# Patient Record
Sex: FEMALE | Race: WHITE | Employment: OTHER | URBAN - METROPOLITAN AREA
[De-identification: names, ages, dates, MRNs, and addresses within clinical notes are randomized per-mention and may not be internally consistent; named-entity substitution may affect disease eponyms.]

---

## 2017-01-23 PROBLEM — E66.01 MORBID OBESITY WITH BMI OF 40.0-44.9, ADULT (HCC): Status: ACTIVE | Noted: 2017-01-23

## 2017-03-28 PROCEDURE — 88305 TISSUE EXAM BY PATHOLOGIST: CPT | Performed by: INTERNAL MEDICINE

## 2017-04-26 PROBLEM — M47.816 LUMBAR ARTHROPATHY: Status: ACTIVE | Noted: 2017-04-26

## 2017-11-03 PROBLEM — Z80.8 FAMILY HISTORY OF MELANOMA: Status: ACTIVE | Noted: 2017-11-03

## 2018-01-09 PROBLEM — R73.03 PREDIABETES: Status: ACTIVE | Noted: 2018-01-09

## 2018-01-09 PROBLEM — R94.31 ABNORMAL EKG: Status: ACTIVE | Noted: 2018-01-09

## 2018-01-09 PROBLEM — Z51.81 ENCOUNTER FOR THERAPEUTIC DRUG MONITORING: Status: ACTIVE | Noted: 2018-01-09

## 2018-01-11 PROBLEM — M47.816 FACET ARTHROPATHY, LUMBAR: Status: ACTIVE | Noted: 2018-01-11

## 2018-01-11 PROBLEM — M54.16 LUMBAR RADICULITIS: Status: ACTIVE | Noted: 2018-01-11

## 2018-01-11 PROBLEM — M51.36 DDD (DEGENERATIVE DISC DISEASE), LUMBAR: Status: ACTIVE | Noted: 2018-01-11

## 2018-01-11 PROBLEM — M47.816 LUMBAR SPONDYLOSIS: Status: ACTIVE | Noted: 2018-01-11

## 2018-01-19 PROCEDURE — 82397 CHEMILUMINESCENT ASSAY: CPT | Performed by: INTERNAL MEDICINE

## 2018-01-19 PROCEDURE — 82607 VITAMIN B-12: CPT | Performed by: INTERNAL MEDICINE

## 2018-01-31 PROBLEM — I70.8 AORTO-ILIAC ATHEROSCLEROSIS (HCC): Status: ACTIVE | Noted: 2018-01-31

## 2018-01-31 PROBLEM — I70.0 AORTO-ILIAC ATHEROSCLEROSIS (HCC): Status: ACTIVE | Noted: 2018-01-31

## 2018-01-31 PROBLEM — I70.8 AORTO-ILIAC ATHEROSCLEROSIS: Status: ACTIVE | Noted: 2018-01-31

## 2018-01-31 PROBLEM — I70.0 AORTO-ILIAC ATHEROSCLEROSIS: Status: ACTIVE | Noted: 2018-01-31

## 2018-02-13 PROCEDURE — 81003 URINALYSIS AUTO W/O SCOPE: CPT | Performed by: FAMILY MEDICINE

## 2018-02-21 PROBLEM — E78.5 HYPERLIPIDEMIA: Status: ACTIVE | Noted: 2018-02-21

## 2018-03-14 PROBLEM — M71.38 SYNOVIAL CYST OF LUMBAR FACET JOINT: Status: ACTIVE | Noted: 2018-03-14

## 2018-03-14 PROBLEM — M43.16 SPONDYLOLISTHESIS OF LUMBAR REGION: Status: ACTIVE | Noted: 2017-04-26

## 2018-06-12 PROBLEM — E66.01 MORBID OBESITY WITH BMI OF 40.0-44.9, ADULT (HCC): Status: RESOLVED | Noted: 2017-01-23 | Resolved: 2018-06-12

## 2018-06-12 PROBLEM — E66.09 CLASS 2 OBESITY DUE TO EXCESS CALORIES WITHOUT SERIOUS COMORBIDITY WITH BODY MASS INDEX (BMI) OF 36.0 TO 36.9 IN ADULT: Status: ACTIVE | Noted: 2018-06-12

## 2018-06-12 PROCEDURE — 87081 CULTURE SCREEN ONLY: CPT | Performed by: FAMILY MEDICINE

## 2018-06-21 ENCOUNTER — LAB ENCOUNTER (OUTPATIENT)
Dept: LAB | Facility: HOSPITAL | Age: 63
End: 2018-06-21
Attending: ORTHOPAEDIC SURGERY
Payer: MEDICARE

## 2018-06-21 DIAGNOSIS — Z01.818 PRE-OP TESTING: ICD-10-CM

## 2018-06-21 PROCEDURE — 86901 BLOOD TYPING SEROLOGIC RH(D): CPT

## 2018-06-21 PROCEDURE — 86850 RBC ANTIBODY SCREEN: CPT

## 2018-06-21 PROCEDURE — 86900 BLOOD TYPING SEROLOGIC ABO: CPT

## 2018-06-21 PROCEDURE — 81003 URINALYSIS AUTO W/O SCOPE: CPT

## 2018-06-21 RX ORDER — NAPROXEN SODIUM 220 MG
TABLET ORAL
Status: ON HOLD | COMMUNITY
End: 2018-07-10

## 2018-07-09 ENCOUNTER — HOSPITAL ENCOUNTER (INPATIENT)
Facility: HOSPITAL | Age: 63
LOS: 1 days | Discharge: HOME | DRG: 455 | End: 2018-07-10
Attending: ORTHOPAEDIC SURGERY | Admitting: ORTHOPAEDIC SURGERY
Payer: MEDICARE

## 2018-07-09 ENCOUNTER — ANESTHESIA EVENT (OUTPATIENT)
Dept: SURGERY | Facility: HOSPITAL | Age: 63
DRG: 455 | End: 2018-07-09
Payer: MEDICARE

## 2018-07-09 ENCOUNTER — APPOINTMENT (OUTPATIENT)
Dept: GENERAL RADIOLOGY | Facility: HOSPITAL | Age: 63
DRG: 455 | End: 2018-07-09
Attending: ORTHOPAEDIC SURGERY
Payer: MEDICARE

## 2018-07-09 ENCOUNTER — ANESTHESIA (OUTPATIENT)
Dept: SURGERY | Facility: HOSPITAL | Age: 63
DRG: 455 | End: 2018-07-09
Payer: MEDICARE

## 2018-07-09 DIAGNOSIS — Z01.818 PRE-OP TESTING: Primary | ICD-10-CM

## 2018-07-09 DIAGNOSIS — M43.16 SPONDYLOLISTHESIS OF LUMBAR REGION: ICD-10-CM

## 2018-07-09 DIAGNOSIS — M54.16 LUMBAR RADICULOPATHY: ICD-10-CM

## 2018-07-09 DIAGNOSIS — M48.061 SPINAL STENOSIS OF LUMBAR REGION, UNSPECIFIED WHETHER NEUROGENIC CLAUDICATION PRESENT: ICD-10-CM

## 2018-07-09 LAB
ERYTHROCYTE [DISTWIDTH] IN BLOOD BY AUTOMATED COUNT: 14 % (ref 11–15)
GLUCOSE BLDC GLUCOMTR-MCNC: 130 MG/DL (ref 70–99)
HCT VFR BLD AUTO: 34.3 % (ref 35–48)
HGB BLD-MCNC: 11.3 G/DL (ref 12–16)
MCH RBC QN AUTO: 28.9 PG (ref 27–32)
MCHC RBC AUTO-ENTMCNC: 33 G/DL (ref 32–37)
MCV RBC AUTO: 87.3 FL (ref 80–100)
PLATELET # BLD AUTO: 160 K/UL (ref 140–400)
PMV BLD AUTO: 9.1 FL (ref 7.4–10.3)
RBC # BLD AUTO: 3.92 M/UL (ref 3.7–5.4)
WBC # BLD AUTO: 9.6 K/UL (ref 4–11)

## 2018-07-09 PROCEDURE — 36415 COLL VENOUS BLD VENIPUNCTURE: CPT | Performed by: ORTHOPAEDIC SURGERY

## 2018-07-09 PROCEDURE — 72100 X-RAY EXAM L-S SPINE 2/3 VWS: CPT | Performed by: ORTHOPAEDIC SURGERY

## 2018-07-09 PROCEDURE — 94660 CPAP INITIATION&MGMT: CPT

## 2018-07-09 PROCEDURE — 0SB40ZZ EXCISION OF LUMBOSACRAL DISC, OPEN APPROACH: ICD-10-PCS | Performed by: ORTHOPAEDIC SURGERY

## 2018-07-09 PROCEDURE — 95860 NEEDLE EMG 1 EXTREMITY: CPT | Performed by: ORTHOPAEDIC SURGERY

## 2018-07-09 PROCEDURE — 01NB0ZZ RELEASE LUMBAR NERVE, OPEN APPROACH: ICD-10-PCS | Performed by: ORTHOPAEDIC SURGERY

## 2018-07-09 PROCEDURE — 76001 XR C-ARM FLUORO >1 HOUR  (CPT=76001): CPT | Performed by: ORTHOPAEDIC SURGERY

## 2018-07-09 PROCEDURE — 0SG3071 FUSION OF LUMBOSACRAL JOINT WITH AUTOLOGOUS TISSUE SUBSTITUTE, POSTERIOR APPROACH, POSTERIOR COLUMN, OPEN APPROACH: ICD-10-PCS | Performed by: ORTHOPAEDIC SURGERY

## 2018-07-09 PROCEDURE — 85027 COMPLETE CBC AUTOMATED: CPT | Performed by: PHYSICIAN ASSISTANT

## 2018-07-09 PROCEDURE — 0SG30AJ FUSION OF LUMBOSACRAL JOINT WITH INTERBODY FUSION DEVICE, POSTERIOR APPROACH, ANTERIOR COLUMN, OPEN APPROACH: ICD-10-PCS | Performed by: ORTHOPAEDIC SURGERY

## 2018-07-09 PROCEDURE — 4A11X4G MONITORING OF PERIPHERAL NERVOUS ELECTRICAL ACTIVITY, INTRAOPERATIVE, EXTERNAL APPROACH: ICD-10-PCS | Performed by: ORTHOPAEDIC SURGERY

## 2018-07-09 PROCEDURE — 95938 SOMATOSENSORY TESTING: CPT | Performed by: ORTHOPAEDIC SURGERY

## 2018-07-09 PROCEDURE — 88304 TISSUE EXAM BY PATHOLOGIST: CPT | Performed by: ORTHOPAEDIC SURGERY

## 2018-07-09 PROCEDURE — 82962 GLUCOSE BLOOD TEST: CPT

## 2018-07-09 DEVICE — 5CC BONE MATRIX-SPINE: Type: IMPLANTABLE DEVICE | Status: FUNCTIONAL

## 2018-07-09 DEVICE — GRAFT BN LRG ALLO FRZN VIABLE: Type: IMPLANTABLE DEVICE | Status: FUNCTIONAL

## 2018-07-09 RX ORDER — LIDOCAINE HYDROCHLORIDE 10 MG/ML
INJECTION, SOLUTION EPIDURAL; INFILTRATION; INTRACAUDAL; PERINEURAL AS NEEDED
Status: DISCONTINUED | OUTPATIENT
Start: 2018-07-09 | End: 2018-07-09 | Stop reason: SURG

## 2018-07-09 RX ORDER — POLYETHYLENE GLYCOL 3350 17 G/17G
17 POWDER, FOR SOLUTION ORAL DAILY PRN
Status: DISCONTINUED | OUTPATIENT
Start: 2018-07-09 | End: 2018-07-10

## 2018-07-09 RX ORDER — OXYCODONE HYDROCHLORIDE AND ACETAMINOPHEN 5; 325 MG/1; MG/1
1 TABLET ORAL EVERY 4 HOURS PRN
Status: DISCONTINUED | OUTPATIENT
Start: 2018-07-09 | End: 2018-07-10

## 2018-07-09 RX ORDER — MONTELUKAST SODIUM 10 MG/1
10 TABLET ORAL NIGHTLY
Status: DISCONTINUED | OUTPATIENT
Start: 2018-07-09 | End: 2018-07-10

## 2018-07-09 RX ORDER — DEXAMETHASONE SODIUM PHOSPHATE 10 MG/ML
8 INJECTION, SOLUTION INTRAMUSCULAR; INTRAVENOUS EVERY 8 HOURS
Status: COMPLETED | OUTPATIENT
Start: 2018-07-09 | End: 2018-07-10

## 2018-07-09 RX ORDER — DEXAMETHASONE SODIUM PHOSPHATE 4 MG/ML
VIAL (ML) INJECTION AS NEEDED
Status: DISCONTINUED | OUTPATIENT
Start: 2018-07-09 | End: 2018-07-09 | Stop reason: SURG

## 2018-07-09 RX ORDER — NEOSTIGMINE METHYLSULFATE 0.5 MG/ML
INJECTION INTRAVENOUS AS NEEDED
Status: DISCONTINUED | OUTPATIENT
Start: 2018-07-09 | End: 2018-07-09 | Stop reason: SURG

## 2018-07-09 RX ORDER — CELECOXIB 100 MG/1
100 CAPSULE ORAL ONCE
Status: COMPLETED | OUTPATIENT
Start: 2018-07-09 | End: 2018-07-09

## 2018-07-09 RX ORDER — METOCLOPRAMIDE HYDROCHLORIDE 5 MG/ML
10 INJECTION INTRAMUSCULAR; INTRAVENOUS EVERY 6 HOURS PRN
Status: DISCONTINUED | OUTPATIENT
Start: 2018-07-09 | End: 2018-07-10

## 2018-07-09 RX ORDER — MAGNESIUM OXIDE 400 MG (241.3 MG MAGNESIUM) TABLET
400 TABLET DAILY
Status: DISCONTINUED | OUTPATIENT
Start: 2018-07-10 | End: 2018-07-10

## 2018-07-09 RX ORDER — MAGNESIUM HYDROXIDE 1200 MG/15ML
LIQUID ORAL CONTINUOUS PRN
Status: DISCONTINUED | OUTPATIENT
Start: 2018-07-09 | End: 2018-07-09

## 2018-07-09 RX ORDER — SODIUM CHLORIDE, SODIUM LACTATE, POTASSIUM CHLORIDE, CALCIUM CHLORIDE 600; 310; 30; 20 MG/100ML; MG/100ML; MG/100ML; MG/100ML
INJECTION, SOLUTION INTRAVENOUS CONTINUOUS
Status: DISCONTINUED | OUTPATIENT
Start: 2018-07-09 | End: 2018-07-09 | Stop reason: HOSPADM

## 2018-07-09 RX ORDER — SODIUM CHLORIDE, SODIUM LACTATE, POTASSIUM CHLORIDE, CALCIUM CHLORIDE 600; 310; 30; 20 MG/100ML; MG/100ML; MG/100ML; MG/100ML
INJECTION, SOLUTION INTRAVENOUS CONTINUOUS
Status: DISCONTINUED | OUTPATIENT
Start: 2018-07-09 | End: 2018-07-10

## 2018-07-09 RX ORDER — DIPHENHYDRAMINE HCL 25 MG
25 CAPSULE ORAL EVERY 4 HOURS PRN
Status: DISCONTINUED | OUTPATIENT
Start: 2018-07-09 | End: 2018-07-10

## 2018-07-09 RX ORDER — CEFAZOLIN SODIUM/WATER 2 G/20 ML
2 SYRINGE (ML) INTRAVENOUS ONCE
Status: COMPLETED | OUTPATIENT
Start: 2018-07-09 | End: 2018-07-09

## 2018-07-09 RX ORDER — HYDROMORPHONE HYDROCHLORIDE 1 MG/ML
0.2 INJECTION, SOLUTION INTRAMUSCULAR; INTRAVENOUS; SUBCUTANEOUS EVERY 2 HOUR PRN
Status: DISCONTINUED | OUTPATIENT
Start: 2018-07-09 | End: 2018-07-10

## 2018-07-09 RX ORDER — HYDROMORPHONE HYDROCHLORIDE 1 MG/ML
0.5 INJECTION, SOLUTION INTRAMUSCULAR; INTRAVENOUS; SUBCUTANEOUS ONCE
Status: DISCONTINUED | OUTPATIENT
Start: 2018-07-09 | End: 2018-07-09 | Stop reason: HOSPADM

## 2018-07-09 RX ORDER — METOCLOPRAMIDE 10 MG/1
10 TABLET ORAL ONCE
Status: DISCONTINUED | OUTPATIENT
Start: 2018-07-09 | End: 2018-07-09 | Stop reason: HOSPADM

## 2018-07-09 RX ORDER — FAMOTIDINE 20 MG/1
20 TABLET ORAL ONCE
Status: COMPLETED | OUTPATIENT
Start: 2018-07-09 | End: 2018-07-09

## 2018-07-09 RX ORDER — TOPIRAMATE 25 MG/1
50 TABLET ORAL 2 TIMES DAILY
Status: DISCONTINUED | OUTPATIENT
Start: 2018-07-09 | End: 2018-07-10

## 2018-07-09 RX ORDER — ACETAMINOPHEN 500 MG
1000 TABLET ORAL ONCE
Status: COMPLETED | OUTPATIENT
Start: 2018-07-09 | End: 2018-07-09

## 2018-07-09 RX ORDER — HYDROMORPHONE HYDROCHLORIDE 1 MG/ML
0.8 INJECTION, SOLUTION INTRAMUSCULAR; INTRAVENOUS; SUBCUTANEOUS EVERY 2 HOUR PRN
Status: DISCONTINUED | OUTPATIENT
Start: 2018-07-09 | End: 2018-07-10

## 2018-07-09 RX ORDER — SCOLOPAMINE TRANSDERMAL SYSTEM 1 MG/1
1 PATCH, EXTENDED RELEASE TRANSDERMAL ONCE
Status: DISCONTINUED | OUTPATIENT
Start: 2018-07-09 | End: 2018-07-12

## 2018-07-09 RX ORDER — TIZANIDINE 4 MG/1
4 TABLET ORAL 3 TIMES DAILY PRN
Status: DISCONTINUED | OUTPATIENT
Start: 2018-07-09 | End: 2018-07-10

## 2018-07-09 RX ORDER — BUPIVACAINE HYDROCHLORIDE 2.5 MG/ML
INJECTION, SOLUTION EPIDURAL; INFILTRATION; INTRACAUDAL AS NEEDED
Status: DISCONTINUED | OUTPATIENT
Start: 2018-07-09 | End: 2018-07-09 | Stop reason: HOSPADM

## 2018-07-09 RX ORDER — GLYCOPYRROLATE 0.2 MG/ML
INJECTION INTRAMUSCULAR; INTRAVENOUS AS NEEDED
Status: DISCONTINUED | OUTPATIENT
Start: 2018-07-09 | End: 2018-07-09 | Stop reason: SURG

## 2018-07-09 RX ORDER — DOCUSATE SODIUM 100 MG/1
100 CAPSULE, LIQUID FILLED ORAL 2 TIMES DAILY
Status: DISCONTINUED | OUTPATIENT
Start: 2018-07-09 | End: 2018-07-10

## 2018-07-09 RX ORDER — ACETAMINOPHEN 10 MG/ML
1000 INJECTION, SOLUTION INTRAVENOUS ONCE
Status: DISCONTINUED | OUTPATIENT
Start: 2018-07-09 | End: 2018-07-09 | Stop reason: HOSPADM

## 2018-07-09 RX ORDER — ONDANSETRON 2 MG/ML
INJECTION INTRAMUSCULAR; INTRAVENOUS AS NEEDED
Status: DISCONTINUED | OUTPATIENT
Start: 2018-07-09 | End: 2018-07-09 | Stop reason: SURG

## 2018-07-09 RX ORDER — OXYCODONE HYDROCHLORIDE AND ACETAMINOPHEN 5; 325 MG/1; MG/1
2 TABLET ORAL EVERY 6 HOURS PRN
Status: DISCONTINUED | OUTPATIENT
Start: 2018-07-09 | End: 2018-07-10

## 2018-07-09 RX ORDER — NALOXONE HYDROCHLORIDE 0.4 MG/ML
80 INJECTION, SOLUTION INTRAMUSCULAR; INTRAVENOUS; SUBCUTANEOUS AS NEEDED
Status: DISCONTINUED | OUTPATIENT
Start: 2018-07-09 | End: 2018-07-09 | Stop reason: HOSPADM

## 2018-07-09 RX ORDER — ONDANSETRON 2 MG/ML
4 INJECTION INTRAMUSCULAR; INTRAVENOUS EVERY 4 HOURS PRN
Status: DISCONTINUED | OUTPATIENT
Start: 2018-07-09 | End: 2018-07-10

## 2018-07-09 RX ORDER — HYDROMORPHONE HYDROCHLORIDE 1 MG/ML
0.4 INJECTION, SOLUTION INTRAMUSCULAR; INTRAVENOUS; SUBCUTANEOUS EVERY 2 HOUR PRN
Status: DISCONTINUED | OUTPATIENT
Start: 2018-07-09 | End: 2018-07-10

## 2018-07-09 RX ORDER — SODIUM PHOSPHATE, DIBASIC AND SODIUM PHOSPHATE, MONOBASIC 7; 19 G/133ML; G/133ML
1 ENEMA RECTAL ONCE AS NEEDED
Status: DISCONTINUED | OUTPATIENT
Start: 2018-07-09 | End: 2018-07-10

## 2018-07-09 RX ORDER — ONDANSETRON 2 MG/ML
4 INJECTION INTRAMUSCULAR; INTRAVENOUS ONCE AS NEEDED
Status: DISCONTINUED | OUTPATIENT
Start: 2018-07-09 | End: 2018-07-09 | Stop reason: HOSPADM

## 2018-07-09 RX ORDER — MIDAZOLAM HYDROCHLORIDE 1 MG/ML
INJECTION INTRAMUSCULAR; INTRAVENOUS AS NEEDED
Status: DISCONTINUED | OUTPATIENT
Start: 2018-07-09 | End: 2018-07-09 | Stop reason: SURG

## 2018-07-09 RX ORDER — TIZANIDINE 4 MG/1
4 TABLET ORAL ONCE
Status: COMPLETED | OUTPATIENT
Start: 2018-07-09 | End: 2018-07-09

## 2018-07-09 RX ORDER — LEVOTHYROXINE SODIUM 0.05 MG/1
50 TABLET ORAL
Status: DISCONTINUED | OUTPATIENT
Start: 2018-07-10 | End: 2018-07-10

## 2018-07-09 RX ORDER — CEFAZOLIN SODIUM/WATER 2 G/20 ML
2 SYRINGE (ML) INTRAVENOUS EVERY 8 HOURS
Status: COMPLETED | OUTPATIENT
Start: 2018-07-09 | End: 2018-07-10

## 2018-07-09 RX ORDER — ROCURONIUM BROMIDE 10 MG/ML
INJECTION, SOLUTION INTRAVENOUS AS NEEDED
Status: DISCONTINUED | OUTPATIENT
Start: 2018-07-09 | End: 2018-07-09 | Stop reason: SURG

## 2018-07-09 RX ORDER — EPHEDRINE SULFATE 50 MG/ML
INJECTION, SOLUTION INTRAVENOUS AS NEEDED
Status: DISCONTINUED | OUTPATIENT
Start: 2018-07-09 | End: 2018-07-09 | Stop reason: SURG

## 2018-07-09 RX ORDER — FLUOXETINE HYDROCHLORIDE 20 MG/1
20 CAPSULE ORAL DAILY
Status: DISCONTINUED | OUTPATIENT
Start: 2018-07-10 | End: 2018-07-10

## 2018-07-09 RX ORDER — SODIUM CHLORIDE 9 MG/ML
INJECTION, SOLUTION INTRAVENOUS CONTINUOUS
Status: DISCONTINUED | OUTPATIENT
Start: 2018-07-09 | End: 2018-07-10

## 2018-07-09 RX ORDER — DIPHENHYDRAMINE HYDROCHLORIDE 50 MG/ML
25 INJECTION INTRAMUSCULAR; INTRAVENOUS EVERY 4 HOURS PRN
Status: DISCONTINUED | OUTPATIENT
Start: 2018-07-09 | End: 2018-07-10

## 2018-07-09 RX ORDER — BISACODYL 10 MG
10 SUPPOSITORY, RECTAL RECTAL
Status: DISCONTINUED | OUTPATIENT
Start: 2018-07-09 | End: 2018-07-10

## 2018-07-09 RX ADMIN — CEFAZOLIN SODIUM/WATER 2 G: 2 G/20 ML SYRINGE (ML) INTRAVENOUS at 13:43:00

## 2018-07-09 RX ADMIN — SODIUM CHLORIDE, SODIUM LACTATE, POTASSIUM CHLORIDE, CALCIUM CHLORIDE: 600; 310; 30; 20 INJECTION, SOLUTION INTRAVENOUS at 13:30:00

## 2018-07-09 RX ADMIN — SODIUM CHLORIDE, SODIUM LACTATE, POTASSIUM CHLORIDE, CALCIUM CHLORIDE: 600; 310; 30; 20 INJECTION, SOLUTION INTRAVENOUS at 16:20:00

## 2018-07-09 RX ADMIN — GLYCOPYRROLATE 0.4 MG: 0.2 INJECTION INTRAMUSCULAR; INTRAVENOUS at 14:35:00

## 2018-07-09 RX ADMIN — LIDOCAINE HYDROCHLORIDE 50 MG: 10 INJECTION, SOLUTION EPIDURAL; INFILTRATION; INTRACAUDAL; PERINEURAL at 13:35:00

## 2018-07-09 RX ADMIN — ROCURONIUM BROMIDE 30 MG: 10 INJECTION, SOLUTION INTRAVENOUS at 13:35:00

## 2018-07-09 RX ADMIN — DEXAMETHASONE SODIUM PHOSPHATE 4 MG: 4 MG/ML VIAL (ML) INJECTION at 13:49:00

## 2018-07-09 RX ADMIN — MIDAZOLAM HYDROCHLORIDE 2 MG: 1 INJECTION INTRAMUSCULAR; INTRAVENOUS at 13:30:00

## 2018-07-09 RX ADMIN — GLYCOPYRROLATE 0.2 MG: 0.2 INJECTION INTRAMUSCULAR; INTRAVENOUS at 14:46:00

## 2018-07-09 RX ADMIN — ONDANSETRON 4 MG: 2 INJECTION INTRAMUSCULAR; INTRAVENOUS at 15:20:00

## 2018-07-09 RX ADMIN — NEOSTIGMINE METHYLSULFATE 2 MG: 0.5 INJECTION INTRAVENOUS at 14:35:00

## 2018-07-09 RX ADMIN — SODIUM CHLORIDE, SODIUM LACTATE, POTASSIUM CHLORIDE, CALCIUM CHLORIDE: 600; 310; 30; 20 INJECTION, SOLUTION INTRAVENOUS at 14:05:00

## 2018-07-09 RX ADMIN — EPHEDRINE SULFATE 10 MG: 50 INJECTION, SOLUTION INTRAVENOUS at 13:50:00

## 2018-07-09 RX ADMIN — ROCURONIUM BROMIDE 20 MG: 10 INJECTION, SOLUTION INTRAVENOUS at 13:55:00

## 2018-07-09 NOTE — ANESTHESIA POSTPROCEDURE EVALUATION
Patient: Andrés Melgar    Procedure Summary     Date:  07/09/18 Room / Location:  [address] [place] [address] MAIN OR 09 / 300 [facility] MAIN OR    Anesthesia Start:  1330 Anesthesia Stop:  2351    Procedure:  POSTERIOR LUMBAR INTERBODY FUSION - MIS TLIF 1 LEVEL (N/A ) Diagnosis:

## 2018-07-09 NOTE — H&P
[facility] Hospitalist H&P     CC: back pain     PCP: Anisha López MD      Assessment and Plan     Maya Melgar is a 58year old female with PMH sig for ALEXUS on CPAP, hypothyroidism, depression who presented for L5-S1 TLIF.     L5-S1 TLIF  -Dilaudid IV p History of blood transfusion 2009   • ALEXUS (obstructive sleep apnea) 04/20/2005    AHI 38   • Other and unspecified hyperlipidemia    • Pulmonary embolism ([facility] [address] 2009   • Sleep apnea     C-Pap   • Thyroid disease         PSH  Past Surgical History:  No date: sneezing  Influenza Vaccines      FEVER    Comment:Cold sxs and fever for many days     Home Medications:    Outpatient Prescriptions Marked as Taking for the 7/9/18 encounter [facility] Encounter):  Naproxen Sodium (ALEVE) 220 MG Oral Tab Take by mouth.  Leopold Martens Brother 28     Melanoma   • Hypertension Brother    • Cancer Brother 72     Prostate CA           Objective     Temp: 98.8 °F (37.1 °C)  Pulse: 79  Resp: 13  BP: 123/53    Exam  Gen: No acute distress, alert and oriented x3  Neck Supple, no JVD  Pulm: Lung

## 2018-07-09 NOTE — PROGRESS NOTES
S: Patient doing well. Seen in PACU. Complaints of low back pain on the right. Denies leg pain. No numbness/tingling. No other complaints. Inspection:  Awake alert No acute distress.  No difficulty breathing     Blood pressure 127/68, pulse 70, temperat

## 2018-07-09 NOTE — ANESTHESIA POSTPROCEDURE EVALUATION
Patient: Sheron Melgar    Procedure Summary     Date:  07/09/18 Room / Location:  [address] [place] [address] MAIN OR 09 / 300 [facility] MAIN OR    Anesthesia Start:  1330 Anesthesia Stop:      Procedure:  POSTERIOR LUMBAR INTERBODY FUSION - MIS TLIF 1 LEVEL (N/A ) Diagnosis:       Lumb

## 2018-07-09 NOTE — ANESTHESIA PROCEDURE NOTES
Airway  Date/Time: 7/9/2018 1:58 PM  Urgency: elective      General Information and Staff    Patient location during procedure: OR  Anesthesiologist: Yudi Neal  Resident/CRNA: Ifeanyi HANSEN  Performed: CRNA     Indications and Patient Rajinder

## 2018-07-09 NOTE — BRIEF OP NOTE
Pre-Operative Diagnosis: Lumbar radiculopathy [M54.16]  Spondylolisthesis of lumbar region [M43.16]  Spinal stenosis of lumbar region, unspecified whether neurogenic claudication present [M48.061]     Post-Operative Diagnosis: Lumbar radiculopathy [M54.16]

## 2018-07-09 NOTE — H&P
HISTORY OF CHIEF COMPLAINT:    Jair Rivera is a 58year old female, who presents today for pre-operation visit. Patient reports continued low back pain for past 4 years with radiation fo pain down her right leg.  She has not improved with time and cons ANESTHETIC/STEROID, TRANSFORAMINAL * Right      Comment: Procedure: LUMBAR / TRANSFORAMINAL EPIDURAL                STEROID INJECTION;  Surgeon: Charmayne Macho, MD;  Location: [address] [place] [address]  7/29/2015: INJECTION, ANESTHETIC/STER CPAP  Stress Concern          No    Comment:IMPROVED DEPRESSION  Exercise                No    Comment:KNEE PAIN  Self-Exams              Yes            Current Medications:     Current Outpatient Prescriptions:  Naproxen Sodium (ALEVE) 220 MG Oral Tab Sharlette Aloe Tab 1 tablet dialy  Disp:  Rfl:    VITAMIN B COMPLEX OR CAPS 1 tablet daily  Disp:  Rfl:                 REVIEW OF SYSTEMS:   GENERAL HEALTH: No fevers, chills, recent weight loss or unremitting night pain.    SKIN: No history of skin rashes. -bruising  EYE walk, slight weakness toe walk, tandem walk intact.     Lumbar/Sacral Integument: Skin over lumbar sacral spine is intact without rashes, excoriations, lesions or erythema noted     Lumbar ROM:                 Forward Flexion               moderate pain height loss. There are mild Modic type II  endplate marrow changes at multiple levels. Reactive marrow edema is seen adjacent to the bilateral  L4-L5 and L5-S1 facet joints, greater on the left. T12-L1: No disc bulge or facet hypertrophy.  Central canal an facet  joint on the comparison MRI has decreased in size on the current study.  There is reactive marrow  edema adjacent to the bilateral facet joints at L4-L5 and L5-S1 (greater on the right); this is  suggestive of active inflammation at these facet joint

## 2018-07-10 ENCOUNTER — APPOINTMENT (OUTPATIENT)
Dept: GENERAL RADIOLOGY | Facility: HOSPITAL | Age: 63
DRG: 455 | End: 2018-07-10
Attending: PHYSICIAN ASSISTANT
Payer: MEDICARE

## 2018-07-10 VITALS
DIASTOLIC BLOOD PRESSURE: 52 MMHG | BODY MASS INDEX: 34.15 KG/M2 | WEIGHT: 200 LBS | OXYGEN SATURATION: 99 % | RESPIRATION RATE: 16 BRPM | SYSTOLIC BLOOD PRESSURE: 137 MMHG | HEART RATE: 70 BPM | HEIGHT: 64 IN | TEMPERATURE: 98 F

## 2018-07-10 PROBLEM — Z01.818 PRE-OP TESTING: Status: ACTIVE | Noted: 2018-01-09

## 2018-07-10 LAB
ANION GAP SERPL CALC-SCNC: 9 MMOL/L (ref 0–18)
BUN SERPL-MCNC: 12 MG/DL (ref 8–20)
BUN/CREAT SERPL: 18.5 (ref 10–20)
CALCIUM SERPL-MCNC: 8.7 MG/DL (ref 8.5–10.5)
CHLORIDE SERPL-SCNC: 105 MMOL/L (ref 95–110)
CO2 SERPL-SCNC: 24 MMOL/L (ref 22–32)
CREAT SERPL-MCNC: 0.65 MG/DL (ref 0.5–1.5)
ERYTHROCYTE [DISTWIDTH] IN BLOOD BY AUTOMATED COUNT: 14.3 % (ref 11–15)
GLUCOSE SERPL-MCNC: 148 MG/DL (ref 70–99)
HBA1C MFR BLD: 5.5 % (ref 4–6)
HCT VFR BLD AUTO: 34.9 % (ref 35–48)
HGB BLD-MCNC: 11.6 G/DL (ref 12–16)
MCH RBC QN AUTO: 29.2 PG (ref 27–32)
MCHC RBC AUTO-ENTMCNC: 33.2 G/DL (ref 32–37)
MCV RBC AUTO: 87.8 FL (ref 80–100)
OSMOLALITY UR CALC.SUM OF ELEC: 289 MOSM/KG (ref 275–295)
PLATELET # BLD AUTO: 146 K/UL (ref 140–400)
PMV BLD AUTO: 9.4 FL (ref 7.4–10.3)
POTASSIUM SERPL-SCNC: 4.3 MMOL/L (ref 3.3–5.1)
RBC # BLD AUTO: 3.98 M/UL (ref 3.7–5.4)
SODIUM SERPL-SCNC: 138 MMOL/L (ref 136–144)
WBC # BLD AUTO: 10.2 K/UL (ref 4–11)

## 2018-07-10 PROCEDURE — 94660 CPAP INITIATION&MGMT: CPT

## 2018-07-10 PROCEDURE — 85027 COMPLETE CBC AUTOMATED: CPT | Performed by: PHYSICIAN ASSISTANT

## 2018-07-10 PROCEDURE — 97535 SELF CARE MNGMENT TRAINING: CPT

## 2018-07-10 PROCEDURE — 97165 OT EVAL LOW COMPLEX 30 MIN: CPT

## 2018-07-10 PROCEDURE — 97116 GAIT TRAINING THERAPY: CPT

## 2018-07-10 PROCEDURE — 97530 THERAPEUTIC ACTIVITIES: CPT

## 2018-07-10 PROCEDURE — 97161 PT EVAL LOW COMPLEX 20 MIN: CPT

## 2018-07-10 PROCEDURE — 80048 BASIC METABOLIC PNL TOTAL CA: CPT | Performed by: PHYSICIAN ASSISTANT

## 2018-07-10 PROCEDURE — 72100 X-RAY EXAM L-S SPINE 2/3 VWS: CPT | Performed by: PHYSICIAN ASSISTANT

## 2018-07-10 PROCEDURE — 83036 HEMOGLOBIN GLYCOSYLATED A1C: CPT | Performed by: HOSPITALIST

## 2018-07-10 RX ORDER — ACETAMINOPHEN AND CODEINE PHOSPHATE 300; 30 MG/1; MG/1
1 TABLET ORAL EVERY 4 HOURS PRN
Status: DISCONTINUED | OUTPATIENT
Start: 2018-07-10 | End: 2018-07-10

## 2018-07-10 RX ORDER — ACETAMINOPHEN 325 MG/1
650 TABLET ORAL EVERY 6 HOURS PRN
Status: DISCONTINUED | OUTPATIENT
Start: 2018-07-10 | End: 2018-07-10

## 2018-07-10 RX ORDER — TIZANIDINE 4 MG/1
4 TABLET ORAL EVERY 8 HOURS PRN
Qty: 50 TABLET | Refills: 0 | Status: SHIPPED | OUTPATIENT
Start: 2018-07-10 | End: 2018-08-02 | Stop reason: ALTCHOICE

## 2018-07-10 RX ORDER — ACETAMINOPHEN AND CODEINE PHOSPHATE 300; 30 MG/1; MG/1
1 TABLET ORAL EVERY 4 HOURS PRN
Qty: 30 TABLET | Refills: 0 | Status: SHIPPED | OUTPATIENT
Start: 2018-07-10 | End: 2018-08-02 | Stop reason: ALTCHOICE

## 2018-07-10 RX ORDER — PSEUDOEPHEDRINE HCL 30 MG
100 TABLET ORAL 2 TIMES DAILY PRN
Qty: 60 CAPSULE | Refills: 0 | Status: SHIPPED | OUTPATIENT
Start: 2018-07-10 | End: 2018-08-02 | Stop reason: ALTCHOICE

## 2018-07-10 NOTE — PROGRESS NOTES
S: Patient is doing well, sitting up in chair this morning. She reports low back muscle spasms, but otherwise pain is well controlled with IV tylenol and decadron. She denies leg pain, reports tingling in right toes when sitting only.  No weakness to lower Rayray Torres PA-C

## 2018-07-10 NOTE — DISCHARGE SUMMARY
[facility] Internal Medicine Discharge Summary   Patient ID:  Donnie Faulkner  [id number]  58year old  2/16/1955    Admit date: 7/9/2018    Discharge date and time: 7/10/2018  1:05 PM     Attending Physician: No att. providers found     Primary Care Physician: additional instructions        CONTINUE taking these medications    Cinnamon 500 MG Tabs     diazepam 5 MG Tabs  Commonly known as:  VALIUM  Take 1 tablet by mouth nightly as needed for Anxiety.      FLUoxetine HCl 10 MG Caps  Commonly known as:  PROZAC  TA images of the lumbar spine were obtained without contrast. COMPARISON: Lumbar spine radiographs dated 6/14/2018. Lumbar spine MRI dated 12/5/2017. FINDINGS: There are five nonrib-bearing lumbar type vertebrae, as demonstrated on comparison radiographs.  Con diameter currently. It contributes to the minimal narrowing of the right neural foramen. Multiple rounded T2 hyperintense foci arising from the left kidney are incompletely evaluated on this study; statistically, they most likely represent cysts.  Similar f changes of posterior spinal fusion at L5-S1 with intact radiographically uncomplicated hardware and alignment.     Dictated by (CST): Lance Dandy, MD on 7/10/2018 at 14:48     Approved by (CST): Lance Dandy, MD on 7/10/2018 at 14:50          Xr Lumbar 07/10/18  1108   BP: 137/52   Pulse: 70   Resp: 16   Temp: 98 °F (36.7 °C)     No acute distress, alert and orientedx3  Lungs Clear  Heart Regular  Abdomen Benign    Total Time Coordinating Care: > than 30 minutes    Patient had opportunity to ask question

## 2018-07-10 NOTE — OCCUPATIONAL THERAPY NOTE
OCCUPATIONAL THERAPY EVALUATION - INPATIENT      Room Number: 422/422-A  Evaluation Date: 7/10/2018  Type of Evaluation: Initial  Presenting Problem: L5-S1 TLIF    Physician Order: IP Consult to Occupational Therapy  Reason for Therapy: ADL/IADL Dysfunctio OCCUPATIONAL THERAPY MEDICAL/SOCIAL HISTORY     Problem List   Active Problems:    Lumbar spinal stenosis      Past Medical History  Past Medical History:   Diagnosis Date   • Calcaneal spur     THERAPY HELPED   • Carpal tunnel syndrome     MODERATELY BONE DENSITY AXIAL (CPT=77080)      Comment: f/u in 2011    HOME SITUATION  Type of Home: House  Home Layout: One level  Lives With: Spouse    Toilet and Equipment: Toilet riser with arms;Standard height toilet  Shower/Tub and Equipment: Walk-in shower; Fela ASSESSMENT  Static Sitting: SPV  Dynamic Sitting: SPV  Static Standing: SBA  Dynamic Standing: SBA    FUNCTIONAL ADL ASSESSMENT  Grooming: SBA for standing balance   Feeding: ind  Bathing: NT  Toileting: ind   Upper Body Dressing: NT  Lower Body Dressing:

## 2018-07-10 NOTE — PHYSICAL THERAPY NOTE
PHYSICAL THERAPY EVALUATION - INPATIENT     Room Number: 422/422-A  Evaluation Date: 7/10/2018  Type of Evaluation: Initial   Physician Order: PT Eval and Treat    Presenting Problem: Lumbar radiculopathy s/p TLIF L5-S1  Reason for Therapy: Mobility Dysfu local autograft with excision of synovial cyst.       Problem List  Active Problems:    Lumbar spinal stenosis      Past Medical History  Past Medical History:   Diagnosis Date   • Calcaneal spur     THERAPY HELPED   • Carpal tunnel syndrome     MODERATELY BONE DENSITY AXIAL (CPT=77080)      Comment: f/u in 2011    HOME SITUATION  Type of Home: House   Home Layout: One level  Stairs to Enter : 4  Railing: No  Stairs to Bedroom: 0       Lives With: Spouse  Drives: Yes  Patient Owned Equipment: Kay Ventura 450  Assistive Device: Rolling walker  Pattern: Within Functional Limits  Stoop/Curb Assistance: Supervision  Comment : step to up leading L, reciprocal descending    Bed Mobility: mod I    Transfers:  Mod I    Exercise/Education Provided:  Bed mobility  Jones

## 2018-07-10 NOTE — PLAN OF CARE
DISCHARGE PLANNING    • Discharge to home or other facility with appropriate resources Adequate for Discharge        HEMATOLOGIC - ADULT    • Free from bleeding injury Adequate for Discharge        MUSCULOSKELETAL - ADULT    • Return mobility to safest lev

## 2018-10-23 PROBLEM — R53.82 CHRONIC FATIGUE: Status: ACTIVE | Noted: 2018-10-23

## 2018-10-24 PROCEDURE — 36415 COLL VENOUS BLD VENIPUNCTURE: CPT | Performed by: INTERNAL MEDICINE

## 2018-10-24 PROCEDURE — 82533 TOTAL CORTISOL: CPT | Performed by: INTERNAL MEDICINE

## 2018-12-11 PROBLEM — Z98.1 S/P LUMBAR SPINAL FUSION: Status: ACTIVE | Noted: 2018-12-11

## 2018-12-11 PROBLEM — G89.29 CHRONIC BILATERAL LOW BACK PAIN WITHOUT SCIATICA: Status: ACTIVE | Noted: 2018-12-11

## 2018-12-11 PROBLEM — M54.50 CHRONIC BILATERAL LOW BACK PAIN WITHOUT SCIATICA: Status: ACTIVE | Noted: 2018-12-11

## 2019-03-12 PROBLEM — M46.96 INFLAMMATORY SPONDYLOPATHY OF LUMBAR REGION (HCC): Status: ACTIVE | Noted: 2019-03-12

## 2019-03-21 PROBLEM — R42 VERTIGO: Status: ACTIVE | Noted: 2019-03-21

## 2019-03-21 PROBLEM — R26.2 DIFFICULTY WALKING: Status: ACTIVE | Noted: 2019-03-21

## 2019-03-21 PROBLEM — M54.2 NECK PAIN: Status: ACTIVE | Noted: 2019-03-21

## 2019-09-17 PROCEDURE — 87086 URINE CULTURE/COLONY COUNT: CPT | Performed by: FAMILY MEDICINE

## 2019-09-24 PROCEDURE — 87086 URINE CULTURE/COLONY COUNT: CPT | Performed by: UROLOGY

## 2019-10-02 PROCEDURE — 87086 URINE CULTURE/COLONY COUNT: CPT | Performed by: UROLOGY

## 2019-10-30 ENCOUNTER — HOSPITAL ENCOUNTER (OUTPATIENT)
Dept: CT IMAGING | Facility: HOSPITAL | Age: 64
Discharge: HOME | End: 2019-10-30
Attending: FAMILY MEDICINE

## 2019-10-30 DIAGNOSIS — Z13.9 ENCOUNTER FOR SCREENING: ICD-10-CM

## 2019-11-04 NOTE — PROGRESS NOTES
Incidentally on the calcium scan several other things were found which are not concerning. Some mild collapse of the lower lung areas seen which is pretty common as we get older. Also some arthritis seen in the spine but is mild.

## 2019-11-04 NOTE — PROGRESS NOTES
Coronary calcium scan showed very minimal amount of plaque and not a concern now but should try to watch diet and exercise going forward to lower risk of cardiovascular disease in the future.

## 2019-11-11 ENCOUNTER — HOSPITAL ENCOUNTER (OUTPATIENT)
Dept: CARDIOLOGY CLINIC | Facility: HOSPITAL | Age: 64
Discharge: HOME | End: 2019-11-11
Attending: FAMILY MEDICINE

## 2019-11-11 DIAGNOSIS — Z13.9 ENCOUNTER FOR SCREENING: ICD-10-CM

## 2019-11-18 PROBLEM — I77.9 BILATERAL CAROTID ARTERY DISEASE (HCC): Status: ACTIVE | Noted: 2019-11-18

## 2020-06-05 PROCEDURE — 88305 TISSUE EXAM BY PATHOLOGIST: CPT | Performed by: INTERNAL MEDICINE

## 2020-11-04 PROBLEM — M71.38 SYNOVIAL CYST OF LUMBAR FACET JOINT: Status: RESOLVED | Noted: 2018-03-14 | Resolved: 2020-11-04

## 2020-11-04 PROBLEM — R26.2 DIFFICULTY WALKING: Status: RESOLVED | Noted: 2019-03-21 | Resolved: 2020-11-04

## 2020-11-04 PROBLEM — M47.816 LUMBAR SPONDYLOSIS: Status: RESOLVED | Noted: 2018-01-11 | Resolved: 2020-11-04

## 2020-11-04 PROBLEM — E66.09 CLASS 2 OBESITY DUE TO EXCESS CALORIES WITHOUT SERIOUS COMORBIDITY WITH BODY MASS INDEX (BMI) OF 36.0 TO 36.9 IN ADULT: Status: RESOLVED | Noted: 2018-06-12 | Resolved: 2020-11-04

## 2020-11-04 PROBLEM — M48.061 LUMBAR SPINAL STENOSIS: Status: RESOLVED | Noted: 2018-07-09 | Resolved: 2020-11-04

## 2020-11-04 PROBLEM — M54.16 LUMBAR RADICULITIS: Status: RESOLVED | Noted: 2018-01-11 | Resolved: 2020-11-04

## 2020-11-04 PROBLEM — R94.31 ABNORMAL EKG: Status: RESOLVED | Noted: 2018-01-09 | Resolved: 2020-11-04

## 2020-11-04 PROBLEM — M51.36 DDD (DEGENERATIVE DISC DISEASE), LUMBAR: Status: RESOLVED | Noted: 2018-01-11 | Resolved: 2020-11-04

## 2020-11-04 PROBLEM — I77.9 BILATERAL CAROTID ARTERY DISEASE (HCC): Status: RESOLVED | Noted: 2019-11-18 | Resolved: 2020-11-04

## 2020-11-04 PROBLEM — M54.50 CHRONIC BILATERAL LOW BACK PAIN WITHOUT SCIATICA: Status: RESOLVED | Noted: 2018-12-11 | Resolved: 2020-11-04

## 2020-11-04 PROBLEM — M54.2 NECK PAIN: Status: RESOLVED | Noted: 2019-03-21 | Resolved: 2020-11-04

## 2020-11-04 PROBLEM — I65.29 CAROTID ATHEROSCLEROSIS: Status: ACTIVE | Noted: 2020-11-04

## 2020-11-04 PROBLEM — G89.29 CHRONIC BILATERAL LOW BACK PAIN WITHOUT SCIATICA: Status: RESOLVED | Noted: 2018-12-11 | Resolved: 2020-11-04

## 2020-11-04 PROBLEM — R42 VERTIGO: Status: RESOLVED | Noted: 2019-03-21 | Resolved: 2020-11-04

## 2020-11-10 PROBLEM — R03.0 BLOOD PRESSURE ELEVATED WITHOUT HISTORY OF HTN: Status: ACTIVE | Noted: 2020-11-10

## 2021-03-10 PROBLEM — F33.1 MODERATE EPISODE OF RECURRENT MAJOR DEPRESSIVE DISORDER (HCC): Status: ACTIVE | Noted: 2021-03-10

## 2023-08-16 ENCOUNTER — GENETICS ENCOUNTER (OUTPATIENT)
Dept: GENETICS | Facility: HOSPITAL | Age: 68
End: 2023-08-16
Attending: GENETIC COUNSELOR, MS
Payer: MEDICARE

## 2023-08-16 ENCOUNTER — NURSE ONLY (OUTPATIENT)
Dept: HEMATOLOGY/ONCOLOGY | Facility: HOSPITAL | Age: 68
End: 2023-08-16
Attending: GENETIC COUNSELOR, MS
Payer: MEDICARE

## 2023-08-16 DIAGNOSIS — Z80.9 FAMILY HISTORY OF CANCER: Primary | ICD-10-CM

## 2023-08-16 DIAGNOSIS — Z80.0 FAMILY HISTORY OF COLON CANCER: ICD-10-CM

## 2023-08-16 DIAGNOSIS — Z80.51 FAMILY HISTORY OF KIDNEY CANCER: ICD-10-CM

## 2023-08-16 DIAGNOSIS — Z80.8 FAMILY HISTORY OF MELANOMA: ICD-10-CM

## 2023-08-16 PROCEDURE — 36415 COLL VENOUS BLD VENIPUNCTURE: CPT

## 2023-08-16 PROCEDURE — 96040 HC GENETIC COUNSELING EA 30 MIN: CPT | Performed by: GENETIC COUNSELOR, MS

## 2023-09-15 ENCOUNTER — GENETICS ENCOUNTER (OUTPATIENT)
Dept: HEMATOLOGY/ONCOLOGY | Facility: HOSPITAL | Age: 68
End: 2023-09-15

## 2023-09-15 DIAGNOSIS — Z15.02 MONOALLELIC MUTATION OF CHEK2 GENE IN FEMALE PATIENT: Primary | ICD-10-CM

## 2023-09-15 DIAGNOSIS — Z15.89 MONOALLELIC MUTATION OF CHEK2 GENE IN FEMALE PATIENT: Primary | ICD-10-CM

## 2023-09-15 DIAGNOSIS — Z15.01 MONOALLELIC MUTATION OF CHEK2 GENE IN FEMALE PATIENT: Primary | ICD-10-CM

## 2023-09-15 DIAGNOSIS — Z15.09 MONOALLELIC MUTATION OF CHEK2 GENE IN FEMALE PATIENT: Primary | ICD-10-CM

## 2023-09-15 NOTE — PROGRESS NOTES
Patient Name: Enrique Wilde  YOB: 1955    Referring Provider:  Amanda Mane MD      Reason for Referral:  Ms. Joaquín Franklin had genetic testing performed on 8/17/2023 because of a family history of cancer. Genetic Testing Result:  Positive. Ms. 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 No other known pathogenic variants were found in a total of 84 genes on the [facility] syndrome panel and multi-cancer + RNA panel including: AIP, ALK, APC*, JR*, AXIN2, BAP1, BARD1, BLM, BMPR1A, BRCA1, BRCA2, BRIP1, CASR, CDC73, CDH1, CDK4, CDKN1B, CDKN1C, CDKN2A (p14ARF), CDKN2A (m68PDU5u), CEBPA, CHEK2, CTNNA1, DICER1*, DIS3L2, EGFR, EPCAM*, FH*, FLCN, GATA2, GPC3*, GREM1*, HOXB13, HRAS, KIT, MAX*, MEN1*, MET*, MITF*, MLH1*, MSH2*, MSH3*, MSH6*, MUTYH, NBN, NF1*, NF2, NTHL1, PALB2, PDGFRA, PHOX2B*, PMS2*, POLD1*, POLE, POT1, FOWQU2E, PTCH1, PTEN*, RAD50, RAD51C, RAD51D, RB1*, RECQL4*, RET, RUNX1, SDHA*, SDHAF2, SDHB, SDHC*, SDHD, SMAD4, SMARCA4, SMARCB1, SMARCE1, STK11, SUFU, TERC, TERT, MWDL493, TP53, TSC1*, TSC2, VHL, WRN*, WT1. Two variants of uncertain significance, NBN c.406G>A (p.Dbj831Utg) and PMS2 c.2431G>A (p.Ybk733Kha), were identified. Please refer to the report from [facility] for additional testing information. These results were discussed with Ms. Melgar via telephone on 9/20/2023. Summary and Plan:  Ms. Joaquín Franklin was found to carry a single CHEK2 c.470T>C (p.Hdm903Ugq) low penetrance pathogenic variant (harmful genetic mutation). Ms. Joaquín Franklin was found to have a NBN c.406G>A (p.Jcm573Mmz) and a PMS2 c.2431G>A (B.JEN209CBJ) variant of uncertain significance. A variant of uncertain significance (VUS) means that a mutation has been identified in a cancer susceptibility gene; however, it is currently uncertain if the mutation is pathogenic (harmful) or benign (harmless).   With time, the mutation may be reclassified as either harmful or harmless. No changes in management or genetic testing of family members is recommended based on a VUS result. The limitations of the testing were discussed with Ms. Melgar including the chance that additional pathogenic variants in a gene other than those included in this panel might be the cause of cancer in Ms. Melgar or in relatives. Information for carriers of a single low penetrance pathogenic CHEK2 c.470T>C (p.Gkp201Pcw) variant  Single pathogenic variants (a harmful genetic mutation) in the CHEK2 gene are associated with an increased risk for typically adult-onset cancers, including breast and colon cancer. The risks of these cancers have been determined to be both variant- and family history-dependent. Additionally, there is emerging evidence that there may be an increased risk for thyroid, prostate, and possibly other cancers for persons with a CHEK2 pathogenic variant, however these risks have yet to be fully confirmed. Lifetime risks for female breast cancer related to CHEK2 \"frameshift variants\", such as 1100delC, have been estimated to be 20-40% in individuals with a single mutation, this risk is elevated compared to the 13% lifetime risk for female breast cancer in the general population. The lifetime risk for colorectal cancer for individuals with a single CHEK2 pathogenic variant is estimated to be elevated at around 5-10%, compared to the 4% risk for the general population. The cancer risks for most CHEK2 \"missense\" variants are unclear, but risks for certain variants, such as the p. Cat094Fue variant Ms. Melgar carries, are lower. The CHEK2 Vov789Wuv variant is reported to cause an increased risk for cancer. However, since this variant is associated with a much lower risk than other pathogenic alleles in the CHEK2 gene, it has been classified as a pathogenic (low penetrance) variant.  Recent data has shown the elevation of breast cancer risk appears to be lower for persons with a CHEK2 Wmg588Siy variant and the risk for breast cancer does not reach the threshold for management change based on the presence of the CHEK2 variant alone. Management (NCCN HBOC V.1.2024;Colorectal V.1.2023; Prostate Cancer Detection V.1.2023)  NCCN states for those with missense variants like Elz255Qnc, where the risk of breast cancer appears to be lower and does not reach the threshold for management change. Additional cancer risk management based on the CHEK2 QQn253Wto variant is not recommended. Breast cancer screening management should be based on best estimates of cancer risk for the specific variant and a person's personal and family history:  Breast cancer (women): Additional breast cancer risk management based on the CHEK2 LKi911Xlq variant is not recommended. Management should be based on personal and family history. It is important to consider both the personal, family history, and discussions with the individual and their physician when forming a plan for cancer prevention and surveillance. Colon cancer (men and women):  Colonoscopy screening every 5 years beginning at age 38y  If an individual has a first-degree relative with colorectal cancer, screening should begin 10 years prior to the relative's age at diagnosis if before 38y. Some patients may elect for less aggressive screening based on shared decision-making with their physician. If an individual has a personal history of colorectal cancer, screening recommendations should be based on recommendations for post-colorectal cancer resection. Prostate cancer (men):  Consider prostate-specific antigen (PSA) screening every 1-2 years starting at age 38y  Consider baseline digital rectal examination (TISH)     The full version of the NCCN Guidelines is available at the following website:  www. NCCN.org.  All medical management decisions should be discussed with a physician.       Inheritance  Hereditary predisposition to cancer due to pathogenic variants in the CHEK2 gene has autosomal dominant inheritance. This means that an individual with a pathogenic variant has a 50% chance of passing the condition onto their offspring. This result allows for the identification of at-risk relatives who can pursue testing for this specific familial variant. Most cases are inherited from a parent, but some cases can occur spontaneously (i.e., an individual with a pathogenic variant has parents who do not have it). Gene information  The CHEK2 gene encodes the CHEK2 enzyme, which helps to ensure accurate DNA repair. In this role, CHEK2 acts as a tumor suppressor by promoting stability of the genome and by preventing tumor formation. The CHEK2 gene is associated with the DNA damage repair response involving the Fanconi anemia-BRCA pathway. A pathogenic variant that disrupts the function of CHEK2 decreases the ability of the cell to protect the integrity of the DNA. The limitations of the testing were discussed with Ms. Melgar including the chance that additional pathogenic variants in a gene other than those included in this panel might be the cause of cancer in Ms. Melgar or in relatives. Support/information websites  (Note: most online CHEK2 risk information and management recommendations are for higher-risk CHEK2 variants and are not specific to the CHEK2 c.470T>C low penetrance variant which has lower cancer risks as outlined above). [employer]: http://www."[employer]com/    [place]: https://brightpink.org/  American Cancer Society: [address] [facility]. org     Implications for family members  It is advantageous to know if a CHEK2 pathogenic variant is present as medical management recommendations can be implemented. At-risk relatives can be identified, allowing pursuit of a diagnostic evaluation.  In addition, the available information regarding hereditary cancer susceptibility genes is constantly evolving and more clinically relevant CHEK2 data is likely to become available in the near future. Awareness of this cancer predisposition allows patients and their providers to be vigilant in maintaining close and regular contact with their local healthcare providers in anticipation of new information, inform at-risk family members, and diligently follow condition-specific screening protocols. We discussed that each of Ms. Melgar's children and siblings has a 50% chance of carrying the same CHEK2 pathogenic variant as Ms. Melgar. I encouraged Ms. Melgar to share the genetic test results with at risk family members on her maternal and paternal side of the family, as it is not yet know from which parent the CHEK2 variant was inherited, so that they may have their cancer risks assessed by a physician and/or genetic counselor. I provided Ms. Melgar with a family letter that she may choose to share with relatives to facilitate this conversation. Ms. Savanah Zamora is also encouraged to contact me on an annual basis to learn if there have been any updates in genetic information that would apply, such as changes in the classification of the NBN or PMS2 VUS, or if the personal and/or family history changes. Please do not hesitate to contact my office if you have any questions or concerns, [phone].      Evonne Sánchez MS, CGC

## 2023-09-19 ENCOUNTER — TELEPHONE (OUTPATIENT)
Dept: HEMATOLOGY/ONCOLOGY | Facility: HOSPITAL | Age: 68
End: 2023-09-19

## 2023-09-20 PROBLEM — Z15.89 MONOALLELIC MUTATION OF CHEK2 GENE IN FEMALE PATIENT: Status: ACTIVE | Noted: 2023-09-01

## 2023-09-20 PROBLEM — Z15.01 MONOALLELIC MUTATION OF CHEK2 GENE IN FEMALE PATIENT: Status: ACTIVE | Noted: 2023-09-01

## 2023-09-20 PROBLEM — Z15.09 MONOALLELIC MUTATION OF CHEK2 GENE IN FEMALE PATIENT: Status: ACTIVE | Noted: 2023-09-01

## 2023-09-20 PROBLEM — Z15.02 MONOALLELIC MUTATION OF CHEK2 GENE IN FEMALE PATIENT: Status: ACTIVE | Noted: 2023-09-01

## (undated) DEVICE — SUTURE MONOCRYL 3-0 Y936H

## (undated) DEVICE — Device

## (undated) DEVICE — SUTURE VICRYL 2-0 CT-2

## (undated) DEVICE — PROXIMATE SKIN STAPLERS (35 WIDE) CONTAINS 35 STAINLESS STEEL STAPLES (FIXED HEAD): Brand: PROXIMATE

## (undated) DEVICE — BONE MARROW ASPIRATION SYRINGE: Brand: IMBIBE

## (undated) DEVICE — NITINOL K-WIRE BLUNT: Brand: ES2 SPINAL SYSTEM

## (undated) DEVICE — CONTAINER SPEC STR 4OZ GRY LID

## (undated) DEVICE — GLOVE SRG BIOGEL 8.5

## (undated) DEVICE — DRAPE SRG 150X54IN LEICA

## (undated) DEVICE — INSULATED BLADE ELECTRODE 6.5

## (undated) DEVICE — AGENT HMST STRL KT MTRX THRMB

## (undated) DEVICE — STERILE LATEX POWDER-FREE SURGICAL GLOVESWITH NITRILE COATING: Brand: PROTEXIS

## (undated) DEVICE — 3.0MM PRECISION NEURO (MATCH HEAD)

## (undated) DEVICE — NV I-PAS III DIAMOND TIP

## (undated) DEVICE — DRESSING AQUACEL AG 3.5X3.75

## (undated) DEVICE — GAUZE SPONGES,12 PLY: Brand: CURITY

## (undated) DEVICE — NV I-PAS III BEVELED TIP STER

## (undated) DEVICE — PAD THRP WRPON PLR LNG STRAP

## (undated) DEVICE — DRAPE SHEET LG

## (undated) DEVICE — STERILE POLYISOPRENE POWDER-FREE SURGICAL GLOVES: Brand: PROTEXIS

## (undated) DEVICE — CAUTERY BLADE 2IN INS E1455

## (undated) DEVICE — SPONGE LAP 18X18 XRAY STRL

## (undated) DEVICE — STERILE LATEX POWDER-FREE SURGICAL GLOVES WITH HYDROGEL COATING, SMOOTH FINISH, STRAIGHT FINGER: Brand: PROTEXIS

## (undated) DEVICE — SOL  .9 1000ML BTL

## (undated) DEVICE — DRAPE SRG 90X60IN BCK TBL CVR

## (undated) DEVICE — 3M(TM) TEGADERM(TM) TRANSPARENT FILM DRESSING FRAME STYLE 1628: Brand: 3M™ TEGADERM™

## (undated) DEVICE — SUTURE VICRYL 0 CT-1

## (undated) DEVICE — BIPOLAR SEALER 23-121-1 AQM EVS: Brand: AQUAMANTYS™

## (undated) DEVICE — LAMINECTOMY: Brand: MEDLINE INDUSTRIES, INC.

## (undated) DEVICE — C-ARMOR C-ARM EQUIPMENT COVERS CLEAR STERILE UNIVERSAL FIT 12 PER CASE: Brand: C-ARMOR

## (undated) DEVICE — POLAR CARE CUBE COOLING UNIT